# Patient Record
Sex: MALE | Race: WHITE | NOT HISPANIC OR LATINO | Employment: FULL TIME | URBAN - METROPOLITAN AREA
[De-identification: names, ages, dates, MRNs, and addresses within clinical notes are randomized per-mention and may not be internally consistent; named-entity substitution may affect disease eponyms.]

---

## 2022-04-29 ENCOUNTER — OFFICE VISIT (OUTPATIENT)
Dept: FAMILY MEDICINE CLINIC | Facility: CLINIC | Age: 37
End: 2022-04-29

## 2022-04-29 VITALS
DIASTOLIC BLOOD PRESSURE: 70 MMHG | HEART RATE: 74 BPM | BODY MASS INDEX: 26.41 KG/M2 | OXYGEN SATURATION: 99 % | HEIGHT: 72 IN | TEMPERATURE: 98.2 F | WEIGHT: 195 LBS | SYSTOLIC BLOOD PRESSURE: 112 MMHG

## 2022-04-29 DIAGNOSIS — M25.432 SWELLING OF JOINT OF LEFT WRIST: ICD-10-CM

## 2022-04-29 DIAGNOSIS — Z76.89 ENCOUNTER TO ESTABLISH CARE: Primary | ICD-10-CM

## 2022-04-29 DIAGNOSIS — M25.532 ARTHRALGIA OF LEFT WRIST: ICD-10-CM

## 2022-04-29 PROCEDURE — 99203 OFFICE O/P NEW LOW 30 MIN: CPT | Performed by: NURSE PRACTITIONER

## 2022-04-29 PROCEDURE — 73110 X-RAY EXAM OF WRIST: CPT | Performed by: NURSE PRACTITIONER

## 2022-04-29 RX ORDER — IBUPROFEN 800 MG/1
800 TABLET ORAL EVERY 8 HOURS PRN
Qty: 60 TABLET | Refills: 1 | Status: SHIPPED | OUTPATIENT
Start: 2022-04-29

## 2022-04-29 NOTE — PROGRESS NOTES
"Chief Complaint  Establish Care and Cyst (Left wrist / causes pain at times)    Subjective          Kofi Rivera III presents to [facility] PRIMARY [facility]ARE  History of Present Illness   36 yr old male presenting to establish care with complaints of L Wrist pain, states he noticed a knot pop up on wrist, which disappeared several days later but then has noticed more swelling, reports tenderness at times but not unbearable, has history of fracture to L wrist, will obtain X-Ray. Plans for annual physical in several weeks.     Objective   Vital Signs:   /70 (BP Location: Left arm, Patient Position: Sitting, Cuff Size: Adult)   Pulse 74   Temp 98.2 °F (36.8 °C) (Oral)   Ht 182.9 cm (72.01\")   Wt 88.5 kg (195 lb)   SpO2 99%   BMI 26.44 kg/m²     Physical Exam  Cardiovascular:      Rate and Rhythm: Normal rate.      Pulses: Normal pulses.      Heart sounds: Normal heart sounds.   Pulmonary:      Effort: Pulmonary effort is normal.      Breath sounds: Normal breath sounds.   Musculoskeletal:      Left wrist: Swelling and tenderness present.   Neurological:      General: No focal deficit present.      Mental Status: He is alert and oriented to person, place, and time.   Psychiatric:         Mood and Affect: Mood normal.         Behavior: Behavior normal.        Result Review :     X:Ray L wrist: no fracture noted, possible fluid or ganglion cyst, will confirm with Radiology report.                    Assessment and Plan    Diagnoses and all orders for this visit:    1. Encounter to establish care (Primary)    2. Swelling of joint of left wrist  -     XR Wrist 3+ View Left (In Office)  -     ibuprofen (ADVIL,MOTRIN) 800 MG tablet; Take 1 tablet by mouth Every 8 (Eight) Hours As Needed for Mild Pain .  Dispense: 60 tablet; Refill: 1    3. Arthralgia of left wrist  -     XR Wrist 3+ View Left (In Office)  -     ibuprofen (ADVIL,MOTRIN) 800 MG tablet; Take 1 tablet by mouth Every 8 (Eight) Hours As " Needed for Mild Pain .  Dispense: 60 tablet; Refill: 1             I spent 35 minutes caring for Kofi on this date of service. This time includes time spent by me in the following activities:obtaining and/or reviewing a separately obtained history, performing a medically appropriate examination and/or evaluation , counseling and educating the patient/family/caregiver, ordering medications, tests, or procedures and documenting information in the medical record  Follow Up   Return in about 1 month (around 5/29/2022) for Annual physical.  Patient was given instructions and counseling regarding his condition or for health maintenance advice. Please see specific information pulled into the AVS if appropriate.

## 2022-05-06 DIAGNOSIS — S63.074A: Primary | ICD-10-CM

## 2022-05-18 ENCOUNTER — OFFICE VISIT (OUTPATIENT)
Dept: FAMILY MEDICINE CLINIC | Facility: CLINIC | Age: 37
End: 2022-05-18

## 2022-05-18 VITALS
RESPIRATION RATE: 18 BRPM | DIASTOLIC BLOOD PRESSURE: 66 MMHG | HEART RATE: 60 BPM | BODY MASS INDEX: 26.09 KG/M2 | WEIGHT: 192.6 LBS | HEIGHT: 72 IN | TEMPERATURE: 97.1 F | SYSTOLIC BLOOD PRESSURE: 114 MMHG

## 2022-05-18 DIAGNOSIS — R68.82 DECREASED LIBIDO: ICD-10-CM

## 2022-05-18 DIAGNOSIS — Z00.00 PHYSICAL EXAM: Primary | ICD-10-CM

## 2022-05-18 DIAGNOSIS — R53.83 FATIGUE, UNSPECIFIED TYPE: ICD-10-CM

## 2022-05-18 DIAGNOSIS — B35.2 TINEA MANUUM: ICD-10-CM

## 2022-05-18 LAB
25(OH)D3 SERPL-MCNC: 22.2 NG/ML (ref 30–100)
ALBUMIN SERPL-MCNC: 4.4 G/DL (ref 3.5–5.2)
ALBUMIN/GLOB SERPL: 1.8 G/DL
ALP SERPL-CCNC: 89 U/L (ref 39–117)
ALT SERPL W P-5'-P-CCNC: 21 U/L (ref 1–41)
ANION GAP SERPL CALCULATED.3IONS-SCNC: 8.4 MMOL/L (ref 5–15)
AST SERPL-CCNC: 13 U/L (ref 1–40)
BILIRUB SERPL-MCNC: 0.9 MG/DL (ref 0–1.2)
BUN SERPL-MCNC: 16 MG/DL (ref 6–20)
BUN/CREAT SERPL: 12.9 (ref 7–25)
CALCIUM SPEC-SCNC: 9.7 MG/DL (ref 8.6–10.5)
CHLORIDE SERPL-SCNC: 102 MMOL/L (ref 98–107)
CHOLEST SERPL-MCNC: 256 MG/DL (ref 0–200)
CO2 SERPL-SCNC: 27.6 MMOL/L (ref 22–29)
CREAT SERPL-MCNC: 1.24 MG/DL (ref 0.76–1.27)
EGFRCR SERPLBLD CKD-EPI 2021: 78.2 ML/MIN/1.73
ERYTHROCYTE [DISTWIDTH] IN BLOOD BY AUTOMATED COUNT: 13 % (ref 12.3–15.4)
GLOBULIN UR ELPH-MCNC: 2.5 GM/DL
GLUCOSE SERPL-MCNC: 98 MG/DL (ref 65–99)
HCT VFR BLD AUTO: 46.8 % (ref 37.5–51)
HDLC SERPL-MCNC: 40 MG/DL (ref 40–60)
HGB BLD-MCNC: 15.7 G/DL (ref 13–17.7)
LDLC SERPL CALC-MCNC: 164 MG/DL (ref 0–100)
LDLC/HDLC SERPL: 4.03 {RATIO}
LYMPHOCYTES # BLD AUTO: 1.9 10*3/MM3 (ref 0.7–3.1)
LYMPHOCYTES NFR BLD AUTO: 36.3 % (ref 19.6–45.3)
MCH RBC QN AUTO: 28 PG (ref 26.6–33)
MCHC RBC AUTO-ENTMCNC: 33.5 G/DL (ref 31.5–35.7)
MCV RBC AUTO: 83.6 FL (ref 79–97)
MONOCYTES # BLD AUTO: 0.1 10*3/MM3 (ref 0.1–0.9)
MONOCYTES NFR BLD AUTO: 1.5 % (ref 5–12)
NEUTROPHILS NFR BLD AUTO: 3.2 10*3/MM3 (ref 1.7–7)
NEUTROPHILS NFR BLD AUTO: 62.2 % (ref 42.7–76)
PLATELET # BLD AUTO: 237 10*3/MM3 (ref 140–450)
PMV BLD AUTO: 8.5 FL (ref 6–12)
POTASSIUM SERPL-SCNC: 4.7 MMOL/L (ref 3.5–5.2)
PROT SERPL-MCNC: 6.9 G/DL (ref 6–8.5)
RBC # BLD AUTO: 5.6 10*6/MM3 (ref 4.14–5.8)
SODIUM SERPL-SCNC: 138 MMOL/L (ref 136–145)
TESTOST SERPL-MCNC: 390 NG/DL (ref 249–836)
TRIGL SERPL-MCNC: 274 MG/DL (ref 0–150)
VLDLC SERPL-MCNC: 52 MG/DL (ref 5–40)
WBC NRBC COR # BLD: 5.2 10*3/MM3 (ref 3.4–10.8)

## 2022-05-18 PROCEDURE — 80053 COMPREHEN METABOLIC PANEL: CPT | Performed by: NURSE PRACTITIONER

## 2022-05-18 PROCEDURE — 84403 ASSAY OF TOTAL TESTOSTERONE: CPT | Performed by: NURSE PRACTITIONER

## 2022-05-18 PROCEDURE — 80061 LIPID PANEL: CPT | Performed by: NURSE PRACTITIONER

## 2022-05-18 PROCEDURE — 82306 VITAMIN D 25 HYDROXY: CPT | Performed by: NURSE PRACTITIONER

## 2022-05-18 PROCEDURE — 99395 PREV VISIT EST AGE 18-39: CPT | Performed by: NURSE PRACTITIONER

## 2022-05-18 PROCEDURE — 85025 COMPLETE CBC W/AUTO DIFF WBC: CPT | Performed by: NURSE PRACTITIONER

## 2022-05-18 PROCEDURE — 99214 OFFICE O/P EST MOD 30 MIN: CPT | Performed by: NURSE PRACTITIONER

## 2022-05-18 RX ORDER — ATORVASTATIN CALCIUM 20 MG/1
20 TABLET, FILM COATED ORAL NIGHTLY
Qty: 90 TABLET | Refills: 2 | Status: SHIPPED | OUTPATIENT
Start: 2022-05-18

## 2022-05-18 RX ORDER — ERGOCALCIFEROL 1.25 MG/1
50000 CAPSULE ORAL WEEKLY
Qty: 12 CAPSULE | Refills: 2 | Status: SHIPPED | OUTPATIENT
Start: 2022-05-18

## 2022-05-18 NOTE — PROGRESS NOTES
"Chief Complaint  Annual Exam    Subjective          Kofi Rivera III presents to [facility]  History of Present Illness   36-year-old healthy male presenting for annual exam, he is fasting today for labs, single, lives alone, exercises 2-3 times weekly, eats well-balanced diet, does not smoke, family history of hyperlipidemia.  He has an ongoing fungus of hands, mainly right hand, states when he uses Lamisil topical cream it goes away but  when he stops using it comes right back,  will refer to dermatology for further evaluation.  He also reports reduction in sexual drive and fatigue, requesting to have testosterone level checked.     Objective   Vital Signs:  /66   Pulse 60   Temp 97.1 °F (36.2 °C)   Resp 18   Ht 182.9 cm (72\")   Wt 87.4 kg (192 lb 9.6 oz)   BMI 26.12 kg/m²   BMI is >= 25 and < 30. (Overweight) The following options were offered after discussion: weight loss educational material (shared in after visit summary)      Physical Exam  Vitals and nursing note reviewed.   Constitutional:       Appearance: He is well-developed.   HENT:      Head: Normocephalic.   Eyes:      Pupils: Pupils are equal, round, and reactive to light.   Cardiovascular:      Rate and Rhythm: Normal rate and regular rhythm.      Heart sounds: Normal heart sounds.   Pulmonary:      Effort: Pulmonary effort is normal.      Breath sounds: Normal breath sounds.   Abdominal:      General: Bowel sounds are normal.      Palpations: Abdomen is soft.   Musculoskeletal:         General: Normal range of motion.      Cervical back: Normal range of motion and neck supple.   Skin:     General: Skin is warm.      Findings: No rash.   Neurological:      Mental Status: He is alert and oriented to person, place, and time.   Psychiatric:         Behavior: Behavior normal.         Thought Content: Thought content normal.         Judgment: Judgment normal.        Result Review :                 Assessment " and Plan    Diagnoses and all orders for this visit:    1. Physical exam (Primary)  -     CBC & Differential  -     Comprehensive Metabolic Panel  -     Lipid Panel    2. Decreased libido  -     Vitamin D 25 Hydroxy  -     Testosterone    3. Fatigue, unspecified type  -     CBC & Differential  -     Comprehensive Metabolic Panel  -     Vitamin D 25 Hydroxy  -     Testosterone    4. Tinea manuum  -     Ambulatory Referral to Dermatology    Counseling was provided on nutrition, physical activity, development, and injury prevention, dental health, and safe sex practices patient verbalizes understanding no additional questions were asked.       Follow Up   Return in about 6 months (around 11/18/2022), or if symptoms worsen or fail to improve, for Recheck.  Patient was given instructions and counseling regarding his condition or for health maintenance advice. Please see specific information pulled into the AVS if appropriate.

## 2023-08-07 ENCOUNTER — OFFICE VISIT (OUTPATIENT)
Dept: FAMILY MEDICINE CLINIC | Facility: CLINIC | Age: 38
End: 2023-08-07
Payer: COMMERCIAL

## 2023-08-07 VITALS
HEART RATE: 69 BPM | SYSTOLIC BLOOD PRESSURE: 124 MMHG | HEIGHT: 72 IN | OXYGEN SATURATION: 98 % | RESPIRATION RATE: 16 BRPM | BODY MASS INDEX: 28.31 KG/M2 | WEIGHT: 209 LBS | DIASTOLIC BLOOD PRESSURE: 62 MMHG

## 2023-08-07 DIAGNOSIS — E55.9 VITAMIN D DEFICIENCY: ICD-10-CM

## 2023-08-07 DIAGNOSIS — E78.00 HYPERCHOLESTEROLEMIA: ICD-10-CM

## 2023-08-07 DIAGNOSIS — Z00.00 PREVENTATIVE HEALTH CARE: ICD-10-CM

## 2023-08-07 DIAGNOSIS — B35.1 ONYCHOMYCOSIS: ICD-10-CM

## 2023-08-07 DIAGNOSIS — Z00.00 ANNUAL PHYSICAL EXAM: Primary | ICD-10-CM

## 2023-08-07 DIAGNOSIS — Z11.59 NEED FOR HEPATITIS C SCREENING TEST: ICD-10-CM

## 2023-08-07 PROCEDURE — 99395 PREV VISIT EST AGE 18-39: CPT | Performed by: NURSE PRACTITIONER

## 2023-08-07 PROCEDURE — 99214 OFFICE O/P EST MOD 30 MIN: CPT | Performed by: NURSE PRACTITIONER

## 2023-08-07 PROCEDURE — 90471 IMMUNIZATION ADMIN: CPT | Performed by: NURSE PRACTITIONER

## 2023-08-07 PROCEDURE — 90715 TDAP VACCINE 7 YRS/> IM: CPT | Performed by: NURSE PRACTITIONER

## 2023-08-07 RX ORDER — TRIAMCINOLONE ACETONIDE 1 MG/G
CREAM TOPICAL
COMMUNITY
Start: 2023-07-11

## 2023-08-07 RX ORDER — TERBINAFINE HYDROCHLORIDE 250 MG/1
250 TABLET ORAL DAILY
Qty: 90 TABLET | Refills: 1 | Status: SHIPPED | OUTPATIENT
Start: 2023-08-07

## 2023-08-07 NOTE — PROGRESS NOTES
Subjective   Kofi Rivera III is a 37 y.o. male. Pt here today to [facility] care hx arthritis HLD Fatigue neck pain     History of Present Illness Kofi has had onychomycosis of the left pinky fingernail for over a year.  He uses topic terbinafine, but the nail does not clear.  He has a rash on his palm that he uses tramcinolone on, which has not completely cleared either.  He is frustrated by this and would like it to clear up.    Kofi also reports he is taking Vitamin D supplements for his low vitamin D levels.  He reports he does feel better now that he is taking this.    The following portions of the patient's history were reviewed and updated as appropriate: allergies, current medications, past family history, past medical history, past social history, past surgical history, and problem list.    Review of Systems   Constitutional:  Positive for fatigue.   HENT: Negative.     Eyes: Negative.    Respiratory: Negative.     Cardiovascular: Negative.    Gastrointestinal: Negative.    Endocrine: Negative.    Genitourinary: Negative.    Musculoskeletal:  Positive for arthralgias and neck pain.   Skin:  Positive for rash.   Allergic/Immunologic: Negative.    Neurological: Negative.    Hematological: Negative.    Psychiatric/Behavioral: Negative.       Objective   Physical Exam  Constitutional:       Appearance: Normal appearance.   HENT:      Head: Normocephalic and atraumatic.      Right Ear: Tympanic membrane, ear canal and external ear normal.      Left Ear: Tympanic membrane, ear canal and external ear normal.      Mouth/Throat:      Mouth: Mucous membranes are moist.   Eyes:      Pupils: Pupils are equal, round, and reactive to light.   Cardiovascular:      Rate and Rhythm: Normal rate and regular rhythm.      Pulses: Normal pulses.      Heart sounds: Normal heart sounds.   Pulmonary:      Effort: Pulmonary effort is normal.      Breath sounds: Normal breath sounds.   Abdominal:      General: Abdomen is flat.    Musculoskeletal:         General: Normal range of motion.   Skin:     General: Skin is warm.      Capillary Refill: Capillary refill takes less than 2 seconds.   Neurological:      General: No focal deficit present.      Mental Status: He is alert.   Psychiatric:         Mood and Affect: Mood normal.         Assessment & Plan   Diagnoses and all orders for this visit:    1. Annual physical exam (Primary)  -     CBC & Differential  -     Comprehensive Metabolic Panel    2. Hypercholesterolemia  -     Lipid Panel    3. Onychomycosis  -     terbinafine (lamiSIL) 250 MG tablet; Take 1 tablet by mouth Daily.  Dispense: 90 tablet; Refill: 1    4. Vitamin D deficiency  -     Vitamin D,25-Hydroxy    5. Need for hepatitis C screening test  -     HCV Antibody Rfx To Qnt PCR    6. Preventative health care  -     Tdap Vaccine Greater Than or Equal To 6yo IM    Discussed Care Gaps, ordered referrals and encouraged vaccination updates.       - Pt agrees with plan of care and denies further questions/concerns today  - This document is intended for medical expert use only. Persons  reading this document without medical staff guidance may result in misinterpretation and unintended morbidity     Go to the ER for any possible life-threatening symptoms such as chest pain or shortness of air.      Please allow 3-5 business days for recommendations based on new results      I personally spent time with this patient, preparing for the visit, reviewing tests, obtaining and/or reviewing a separately obtained history, performing a medically appropriate examination and/or evaluation, counseling and educating the patient/family/caregiver, ordering medications,  documenting information in the medical record and indepentently interpreting results.         Return in about 6 months (around 2/7/2024) for Next scheduled follow up.

## 2023-08-08 NOTE — PROGRESS NOTES
Kofi,    Your cholesterol levels need major work.  Your Triglycerides are over 900 and some values cannot be calculated because of this.  You need to start a low-fat diet, avoid all fast food, all processed foods, eat more vegetables and fruits, limit red meat to once a week.  No torres, no sausage, no brats!!!!!  Increase your exercise to 45 minutes 5 days a week.  Your vitamin D level is still low.  Get in the habit of taking this either with breakfast for take it at bedtime, that way you won't forget it.  You were non-reactive to Hep C, meaning you have not been exposed, which is good.  Your liver and kidney functions look fine.    Work on your cholesterol - make sure you are taking the statin as prescribed.  We will see you in 6 months unless you need us sooner.

## 2023-08-09 LAB
25(OH)D3+25(OH)D2 SERPL-MCNC: 25.5 NG/ML (ref 30–100)
ALBUMIN SERPL-MCNC: 4.5 G/DL (ref 4.1–5.1)
ALBUMIN/GLOB SERPL: 2.1 {RATIO} (ref 1.2–2.2)
ALP SERPL-CCNC: 82 IU/L (ref 44–121)
ALT SERPL-CCNC: 29 IU/L (ref 0–44)
AST SERPL-CCNC: 20 IU/L (ref 0–40)
BASOPHILS # BLD AUTO: 0 X10E3/UL (ref 0–0.2)
BASOPHILS NFR BLD AUTO: 1 %
BILIRUB SERPL-MCNC: 0.7 MG/DL (ref 0–1.2)
BUN SERPL-MCNC: 13 MG/DL (ref 6–20)
BUN/CREAT SERPL: 11 (ref 9–20)
CALCIUM SERPL-MCNC: 9.6 MG/DL (ref 8.7–10.2)
CHLORIDE SERPL-SCNC: 99 MMOL/L (ref 96–106)
CHOLEST SERPL-MCNC: 301 MG/DL (ref 100–199)
CO2 SERPL-SCNC: 24 MMOL/L (ref 20–29)
CREAT SERPL-MCNC: 1.22 MG/DL (ref 0.76–1.27)
EGFRCR SERPLBLD CKD-EPI 2021: 79 ML/MIN/1.73
EOSINOPHIL # BLD AUTO: 0.1 X10E3/UL (ref 0–0.4)
EOSINOPHIL NFR BLD AUTO: 1 %
ERYTHROCYTE [DISTWIDTH] IN BLOOD BY AUTOMATED COUNT: 13.3 % (ref 11.6–15.4)
GLOBULIN SER CALC-MCNC: 2.1 G/DL (ref 1.5–4.5)
GLUCOSE SERPL-MCNC: 97 MG/DL (ref 70–99)
HCT VFR BLD AUTO: 50.5 % (ref 37.5–51)
HCV AB SERPL QL IA: NORMAL
HCV IGG SERPL QL IA: NON REACTIVE
HDLC SERPL-MCNC: 33 MG/DL
HGB BLD-MCNC: 17.2 G/DL (ref 13–17.7)
IMM GRANULOCYTES # BLD AUTO: 0 X10E3/UL (ref 0–0.1)
IMM GRANULOCYTES NFR BLD AUTO: 0 %
LDLC SERPL CALC-MCNC: ABNORMAL MG/DL (ref 0–99)
LYMPHOCYTES # BLD AUTO: 2.2 X10E3/UL (ref 0.7–3.1)
LYMPHOCYTES NFR BLD AUTO: 33 %
MCH RBC QN AUTO: 29.2 PG (ref 26.6–33)
MCHC RBC AUTO-ENTMCNC: 34.1 G/DL (ref 31.5–35.7)
MCV RBC AUTO: 86 FL (ref 79–97)
MONOCYTES # BLD AUTO: 0.4 X10E3/UL (ref 0.1–0.9)
MONOCYTES NFR BLD AUTO: 6 %
NEUTROPHILS # BLD AUTO: 3.9 X10E3/UL (ref 1.4–7)
NEUTROPHILS NFR BLD AUTO: 59 %
PLATELET # BLD AUTO: 221 X10E3/UL (ref 150–450)
POTASSIUM SERPL-SCNC: 4.7 MMOL/L (ref 3.5–5.2)
PROT SERPL-MCNC: 6.6 G/DL (ref 6–8.5)
RBC # BLD AUTO: 5.89 X10E6/UL (ref 4.14–5.8)
SODIUM SERPL-SCNC: 139 MMOL/L (ref 134–144)
TRIGL SERPL-MCNC: 937 MG/DL (ref 0–149)
VLDLC SERPL CALC-MCNC: ABNORMAL MG/DL (ref 5–40)
WBC # BLD AUTO: 6.6 X10E3/UL (ref 3.4–10.8)

## 2023-11-20 RX ORDER — ATORVASTATIN CALCIUM 20 MG/1
20 TABLET, FILM COATED ORAL NIGHTLY
Qty: 90 TABLET | Refills: 3 | Status: SHIPPED | OUTPATIENT
Start: 2023-11-20

## 2024-02-07 ENCOUNTER — OFFICE VISIT (OUTPATIENT)
Dept: FAMILY MEDICINE CLINIC | Facility: CLINIC | Age: 39
End: 2024-02-07
Payer: COMMERCIAL

## 2024-02-07 VITALS
HEIGHT: 72 IN | OXYGEN SATURATION: 98 % | DIASTOLIC BLOOD PRESSURE: 76 MMHG | BODY MASS INDEX: 27.6 KG/M2 | HEART RATE: 72 BPM | WEIGHT: 203.8 LBS | SYSTOLIC BLOOD PRESSURE: 130 MMHG

## 2024-02-07 DIAGNOSIS — E55.9 VITAMIN D DEFICIENCY: ICD-10-CM

## 2024-02-07 DIAGNOSIS — E78.2 MIXED HYPERLIPIDEMIA: Primary | ICD-10-CM

## 2024-02-07 PROCEDURE — 99213 OFFICE O/P EST LOW 20 MIN: CPT | Performed by: NURSE PRACTITIONER

## 2024-02-07 RX ORDER — ERGOCALCIFEROL 1.25 MG/1
50000 CAPSULE ORAL WEEKLY
Qty: 12 CAPSULE | Refills: 2 | Status: SHIPPED | OUTPATIENT
Start: 2024-02-07

## 2024-02-07 RX ORDER — ATORVASTATIN CALCIUM 20 MG/1
20 TABLET, FILM COATED ORAL NIGHTLY
Qty: 90 TABLET | Refills: 3 | Status: SHIPPED | OUTPATIENT
Start: 2024-02-07

## 2024-02-07 NOTE — PROGRESS NOTES
Subjective   Kofi Rivera III is a 38 y.o. male. Presents today for   Chief Complaint   Patient presents with    Hyperlipidemia     Vit D  Needs lab to review levels       History Of Present Illness  He has hyperlipidemia and has been taking his medication regularly.    Vitamin D deficiency - he is taking 2 gummies a day.      Patient Active Problem List   Diagnosis    Painful orthopaedic hardware       Social History     Socioeconomic History    Marital status:    Tobacco Use    Smoking status: Never    Smokeless tobacco: Never   Vaping Use    Vaping Use: Never used   Substance and Sexual Activity    Alcohol use: Yes     Alcohol/week: 1.0 standard drink of alcohol     Types: 1 Shots of liquor per week     Comment: SOCIALLY COUPLE DRINKS A MONTH    Drug use: No    Sexual activity: Yes     Partners: Female       No Known Allergies    Current Outpatient Medications on File Prior to Visit   Medication Sig Dispense Refill    ibuprofen (ADVIL,MOTRIN) 800 MG tablet Take 1 tablet by mouth Every 8 (Eight) Hours As Needed for Mild Pain . 60 tablet 1    [DISCONTINUED] atorvastatin (Lipitor) 20 MG tablet Take 1 tablet by mouth Every Night. 90 tablet 3    [DISCONTINUED] vitamin D (ERGOCALCIFEROL) 1.25 MG (75218 UT) capsule capsule Take 1 capsule by mouth 1 (One) Time Per Week. 12 capsule 2    [DISCONTINUED] econazole nitrate (SPECTAZOLE) 1 % cream APPLY TOPICALLY TO AFFECTED NAILS 2 TIMES A DAY (Patient not taking: Reported on 2/7/2024)      [DISCONTINUED] terbinafine (lamiSIL) 250 MG tablet Take 1 tablet by mouth Daily. (Patient not taking: Reported on 2/7/2024) 90 tablet 1    [DISCONTINUED] triamcinolone (KENALOG) 0.1 % cream Apply topically to affected itchy spots on hands and feet 2 times a day as needed for flares only for 2 weeks per month; discontinue upon im (Patient not taking: Reported on 2/7/2024)       No current facility-administered medications on file prior to visit.       Objective   Vitals:    02/07/24  "1403   BP: 130/76   Pulse: 72   SpO2: 98%   Weight: 92.4 kg (203 lb 12.8 oz)   Height: 182.9 cm (72\")     Body mass index is 27.64 kg/m².    Physical Exam  Constitutional:       Appearance: He is obese.   HENT:      Head: Normocephalic and atraumatic.      Nose: Nose normal.      Mouth/Throat:      Mouth: Mucous membranes are dry.   Eyes:      Pupils: Pupils are equal, round, and reactive to light.   Cardiovascular:      Rate and Rhythm: Normal rate and regular rhythm.      Pulses: Normal pulses.      Heart sounds: Normal heart sounds.   Pulmonary:      Effort: Pulmonary effort is normal.      Breath sounds: Normal breath sounds.   Abdominal:      General: Bowel sounds are normal.   Musculoskeletal:         General: Normal range of motion.   Skin:     General: Skin is warm and dry.      Capillary Refill: Capillary refill takes less than 2 seconds.   Neurological:      General: No focal deficit present.      Mental Status: He is alert.   Psychiatric:         Mood and Affect: Mood normal.     Procedures     Assessment & Plan   Diagnoses and all orders for this visit:    1. Mixed hyperlipidemia (Primary)  -     CBC & Differential  -     Comprehensive Metabolic Panel  -     Lipid Panel  -     atorvastatin (Lipitor) 20 MG tablet; Take 1 tablet by mouth Every Night.  Dispense: 90 tablet; Refill: 3    2. Vitamin D deficiency  -     Vitamin D,25-Hydroxy  -     vitamin D (ERGOCALCIFEROL) 1.25 MG (83614 UT) capsule capsule; Take 1 capsule by mouth 1 (One) Time Per Week.  Dispense: 12 capsule; Refill: 2    Discussed Care Gaps, ordered referrals and encouraged vaccination updates.       - Pt agrees with plan of care and denies further questions/concerns today  - This document is intended for medical expert use only. Persons  reading this document without medical staff guidance may result in misinterpretation and unintended morbidity     Go to the ER for any possible life-threatening symptoms such as chest pain or shortness of " air.      Please allow 3-5 business days for recommendations based on new results      I personally spent time with this patient, preparing for the visit, reviewing tests, obtaining and/or reviewing a separately obtained history, performing a medically appropriate examination and/or evaluation, counseling and educating the patient/family/caregiver, ordering medications,  documenting information in the medical record and indepentently interpreting results.              BMI is >= 25 and <30. (Overweight) The following options were offered after discussion;: weight loss educational material (shared in after visit summary), exercise counseling/recommendations, nutrition counseling/recommendations, and pharmacological intervention options     Return in about 6 months (around 8/7/2024).        Answers submitted by the patient for this visit:  Primary Reason for Visit (Submitted on 2/7/2024)  What is the primary reason for your visit?: Other  Other (Submitted on 2/7/2024)  Please describe your symptoms.: fatigue , Checkup for cholesterol and vitamin levels  Have you had these symptoms before?: Yes  How long have you been having these symptoms?: Greater than 2 weeks  Please list any medications you are currently taking for this condition.: OTC vitamin D3 2000UI/day, Atorvostatin 20MG/day  Please describe any probable cause for these symptoms. : vitamin D deficiency , High cholesterol

## 2024-02-08 LAB
25(OH)D3+25(OH)D2 SERPL-MCNC: 36.6 NG/ML (ref 30–100)
ALBUMIN SERPL-MCNC: 4.8 G/DL (ref 4.1–5.1)
ALBUMIN/GLOB SERPL: 2.5 {RATIO} (ref 1.2–2.2)
ALP SERPL-CCNC: 90 IU/L (ref 44–121)
ALT SERPL-CCNC: 31 IU/L (ref 0–44)
AST SERPL-CCNC: 17 IU/L (ref 0–40)
BASOPHILS # BLD AUTO: 0 X10E3/UL (ref 0–0.2)
BASOPHILS NFR BLD AUTO: 1 %
BILIRUB SERPL-MCNC: 1.1 MG/DL (ref 0–1.2)
BUN SERPL-MCNC: 13 MG/DL (ref 6–20)
BUN/CREAT SERPL: 11 (ref 9–20)
CALCIUM SERPL-MCNC: 9.9 MG/DL (ref 8.7–10.2)
CHLORIDE SERPL-SCNC: 101 MMOL/L (ref 96–106)
CHOLEST SERPL-MCNC: 165 MG/DL (ref 100–199)
CO2 SERPL-SCNC: 29 MMOL/L (ref 20–29)
CREAT SERPL-MCNC: 1.2 MG/DL (ref 0.76–1.27)
EGFRCR SERPLBLD CKD-EPI 2021: 80 ML/MIN/1.73
EOSINOPHIL # BLD AUTO: 0 X10E3/UL (ref 0–0.4)
EOSINOPHIL NFR BLD AUTO: 1 %
ERYTHROCYTE [DISTWIDTH] IN BLOOD BY AUTOMATED COUNT: 12.5 % (ref 11.6–15.4)
GLOBULIN SER CALC-MCNC: 1.9 G/DL (ref 1.5–4.5)
GLUCOSE SERPL-MCNC: 108 MG/DL (ref 70–99)
HCT VFR BLD AUTO: 50.9 % (ref 37.5–51)
HDLC SERPL-MCNC: 38 MG/DL
HGB BLD-MCNC: 16.9 G/DL (ref 13–17.7)
IMM GRANULOCYTES # BLD AUTO: 0 X10E3/UL (ref 0–0.1)
IMM GRANULOCYTES NFR BLD AUTO: 0 %
LDLC SERPL CALC-MCNC: 94 MG/DL (ref 0–99)
LYMPHOCYTES # BLD AUTO: 1.8 X10E3/UL (ref 0.7–3.1)
LYMPHOCYTES NFR BLD AUTO: 30 %
MCH RBC QN AUTO: 28.7 PG (ref 26.6–33)
MCHC RBC AUTO-ENTMCNC: 33.2 G/DL (ref 31.5–35.7)
MCV RBC AUTO: 87 FL (ref 79–97)
MONOCYTES # BLD AUTO: 0.3 X10E3/UL (ref 0.1–0.9)
MONOCYTES NFR BLD AUTO: 6 %
NEUTROPHILS # BLD AUTO: 3.7 X10E3/UL (ref 1.4–7)
NEUTROPHILS NFR BLD AUTO: 62 %
PLATELET # BLD AUTO: 243 X10E3/UL (ref 150–450)
POTASSIUM SERPL-SCNC: 4.7 MMOL/L (ref 3.5–5.2)
PROT SERPL-MCNC: 6.7 G/DL (ref 6–8.5)
RBC # BLD AUTO: 5.88 X10E6/UL (ref 4.14–5.8)
SODIUM SERPL-SCNC: 143 MMOL/L (ref 134–144)
TRIGL SERPL-MCNC: 194 MG/DL (ref 0–149)
VLDLC SERPL CALC-MCNC: 33 MG/DL (ref 5–40)
WBC # BLD AUTO: 6 X10E3/UL (ref 3.4–10.8)

## 2024-07-30 ENCOUNTER — OFFICE VISIT (OUTPATIENT)
Dept: FAMILY MEDICINE CLINIC | Facility: CLINIC | Age: 39
End: 2024-07-30
Payer: COMMERCIAL

## 2024-07-30 VITALS
HEIGHT: 72 IN | WEIGHT: 206 LBS | SYSTOLIC BLOOD PRESSURE: 130 MMHG | DIASTOLIC BLOOD PRESSURE: 68 MMHG | HEART RATE: 50 BPM | BODY MASS INDEX: 27.9 KG/M2 | OXYGEN SATURATION: 97 %

## 2024-07-30 DIAGNOSIS — E78.2 MIXED HYPERLIPIDEMIA: Primary | ICD-10-CM

## 2024-07-30 LAB
BASOPHILS # BLD AUTO: 0.03 10*3/MM3 (ref 0–0.2)
BASOPHILS NFR BLD AUTO: 0.6 % (ref 0–1.5)
BUN SERPL-MCNC: 13 MG/DL (ref 6–20)
BUN/CREAT SERPL: 10.2 (ref 7–25)
CALCIUM SERPL-MCNC: 8.9 MG/DL (ref 8.6–10.5)
CHLORIDE SERPL-SCNC: 106 MMOL/L (ref 98–107)
CHOLEST SERPL-MCNC: 160 MG/DL (ref 0–200)
CO2 SERPL-SCNC: 28.4 MMOL/L (ref 22–29)
CREAT SERPL-MCNC: 1.27 MG/DL (ref 0.76–1.27)
EGFRCR SERPLBLD CKD-EPI 2021: 75.1 ML/MIN/1.73
EOSINOPHIL # BLD AUTO: 0.05 10*3/MM3 (ref 0–0.4)
EOSINOPHIL NFR BLD AUTO: 0.9 % (ref 0.3–6.2)
ERYTHROCYTE [DISTWIDTH] IN BLOOD BY AUTOMATED COUNT: 12.8 % (ref 12.3–15.4)
GLUCOSE SERPL-MCNC: 95 MG/DL (ref 65–99)
HBA1C MFR BLD: 5.6 % (ref 4.8–5.6)
HCT VFR BLD AUTO: 45.9 % (ref 37.5–51)
HDLC SERPL-MCNC: 32 MG/DL (ref 40–60)
HGB BLD-MCNC: 15.2 G/DL (ref 13–17.7)
IMM GRANULOCYTES # BLD AUTO: 0.01 10*3/MM3 (ref 0–0.05)
IMM GRANULOCYTES NFR BLD AUTO: 0.2 % (ref 0–0.5)
LDLC SERPL CALC-MCNC: 73 MG/DL (ref 0–100)
LYMPHOCYTES # BLD AUTO: 1.98 10*3/MM3 (ref 0.7–3.1)
LYMPHOCYTES NFR BLD AUTO: 36.5 % (ref 19.6–45.3)
MCH RBC QN AUTO: 28.8 PG (ref 26.6–33)
MCHC RBC AUTO-ENTMCNC: 33.1 G/DL (ref 31.5–35.7)
MCV RBC AUTO: 87.1 FL (ref 79–97)
MONOCYTES # BLD AUTO: 0.35 10*3/MM3 (ref 0.1–0.9)
MONOCYTES NFR BLD AUTO: 6.4 % (ref 5–12)
NEUTROPHILS # BLD AUTO: 3.01 10*3/MM3 (ref 1.7–7)
NEUTROPHILS NFR BLD AUTO: 55.4 % (ref 42.7–76)
NRBC BLD AUTO-RTO: 0 /100 WBC (ref 0–0.2)
PLATELET # BLD AUTO: 212 10*3/MM3 (ref 140–450)
POTASSIUM SERPL-SCNC: 4 MMOL/L (ref 3.5–5.2)
RBC # BLD AUTO: 5.27 10*6/MM3 (ref 4.14–5.8)
SODIUM SERPL-SCNC: 142 MMOL/L (ref 136–145)
TRIGL SERPL-MCNC: 343 MG/DL (ref 0–150)
VLDLC SERPL CALC-MCNC: 55 MG/DL (ref 5–40)
WBC # BLD AUTO: 5.43 10*3/MM3 (ref 3.4–10.8)

## 2024-07-30 PROCEDURE — 99213 OFFICE O/P EST LOW 20 MIN: CPT | Performed by: NURSE PRACTITIONER

## 2024-07-30 NOTE — PROGRESS NOTES
"Subjective   Kofi Rivera III is a 38 y.o. male. Presents today for   Chief Complaint   Patient presents with   • Hyperlipidemia     6 month Follow up    Too soon for Physical Pt had rescheduled due to work Schedule       History Of Present Illness:      Hyperlipidemia:  Atorvastatin    Vitamin D Deficiency:        Patient Active Problem List   Diagnosis   • Painful orthopaedic hardware       Social History     Socioeconomic History   • Marital status:    Tobacco Use   • Smoking status: Never   • Smokeless tobacco: Never   Vaping Use   • Vaping status: Never Used   Substance and Sexual Activity   • Alcohol use: Yes     Alcohol/week: 1.0 standard drink of alcohol     Types: 1 Shots of liquor per week     Comment: SOCIALLY COUPLE DRINKS A MONTH   • Drug use: No   • Sexual activity: Yes     Partners: Female       No Known Allergies    Current Outpatient Medications on File Prior to Visit   Medication Sig Dispense Refill   • atorvastatin (Lipitor) 20 MG tablet Take 1 tablet by mouth Every Night. 90 tablet 3   • vitamin D (ERGOCALCIFEROL) 1.25 MG (92374 UT) capsule capsule Take 1 capsule by mouth 1 (One) Time Per Week. 12 capsule 2   • ibuprofen (ADVIL,MOTRIN) 800 MG tablet Take 1 tablet by mouth Every 8 (Eight) Hours As Needed for Mild Pain . (Patient not taking: Reported on 7/30/2024) 60 tablet 1     No current facility-administered medications on file prior to visit.       Objective   Vitals:    07/30/24 0820   BP: 130/68   Pulse: 50   SpO2: 97%   Weight: 93.4 kg (206 lb)   Height: 182.9 cm (72\")     Body mass index is 27.94 kg/m².    Physical Exam  Constitutional:       Appearance: He is obese.   HENT:      Head: Normocephalic and atraumatic.      Nose: Nose normal.      Mouth/Throat:      Mouth: Mucous membranes are moist.   Eyes:      Pupils: Pupils are equal, round, and reactive to light.   Cardiovascular:      Rate and Rhythm: Normal rate and regular rhythm.      Pulses: Normal pulses.      Heart sounds: " Normal heart sounds.   Pulmonary:      Effort: Pulmonary effort is normal.      Breath sounds: Normal breath sounds.   Abdominal:      General: Abdomen is flat.      Palpations: Abdomen is soft.   Musculoskeletal:         General: Normal range of motion.   Skin:     General: Skin is warm and dry.      Capillary Refill: Capillary refill takes less than 2 seconds.   Neurological:      General: No focal deficit present.      Mental Status: He is alert.   Psychiatric:         Mood and Affect: Mood normal.         Procedures     Assessment & Plan   There are no diagnoses linked to this encounter.                  No follow-ups on file.

## 2025-01-31 NOTE — PROGRESS NOTES
Subjective   Kofi Rivera III is a 39 y.o. male. Presents today for   Chief Complaint   Patient presents with    Hyperlipidemia       History Of Present Illness Kofi Lewis is a 39-year-old male presenting for 6-month follow-up    Hyperlipidemia: Atorvastatin    Care gaps:  COVID  Influenza today    History of Present Illness  The patient presents for evaluation of cholesterol management, elevated blood pressure, and medication management.    He reports a significant improvement in his mental health since starting his new job on 11/23/2024. He has been adhering to a diet low in red meat, with occasional lapses. He is uncertain about the necessity of continuing biannual visits if his condition remains stable. He is also curious about the potential for dosage reduction or discontinuation of atorvastatin if his cholesterol levels continue to improve. He took his atorvastatin at 5:00 AM today and is seeking clarification on whether this will affect his fasting blood work results. He typically takes his atorvastatin once daily, either at night or early in the morning, depending on his sleep schedule.    He has been experiencing elevated blood pressure, which he attributes to recent cessation of caffeine intake approximately 6 weeks ago. He also reports experiencing white coat syndrome, a condition characterized by anxiety-induced hypertension during doctor's visits.    He is nearing the end of his atorvastatin supply and has run out of vitamin D, having forgotten to take it over the weekend. His wife informed him that he does not have any refills remaining for the vitamin D prescription.    Supplemental Information  He reports no current issues with his wrist and notes an improvement in the condition of his feet, ankles, and knees.    SOCIAL HISTORY  He recently quit drinking caffeine 6 weeks ago.    MEDICATIONS  atorvastatin, vitamin D    Patient Active Problem List   Diagnosis    Painful orthopaedic hardware  "      Social History     Socioeconomic History    Marital status:    Tobacco Use    Smoking status: Never    Smokeless tobacco: Never   Vaping Use    Vaping status: Never Used   Substance and Sexual Activity    Alcohol use: Yes     Alcohol/week: 1.0 standard drink of alcohol     Types: 1 Shots of liquor per week     Comment: SOCIALLY COUPLE DRINKS A MONTH    Drug use: Yes     Types: Cocaine(coke)    Sexual activity: Yes     Partners: Female       No Known Allergies    Current Outpatient Medications on File Prior to Visit   Medication Sig Dispense Refill    ibuprofen (ADVIL,MOTRIN) 800 MG tablet Take 1 tablet by mouth Every 8 (Eight) Hours As Needed for Mild Pain . 60 tablet 1    [DISCONTINUED] atorvastatin (Lipitor) 20 MG tablet Take 1 tablet by mouth Every Night. 90 tablet 3    [DISCONTINUED] vitamin D (ERGOCALCIFEROL) 1.25 MG (30488 UT) capsule capsule Take 1 capsule by mouth 1 (One) Time Per Week. 12 capsule 2     No current facility-administered medications on file prior to visit.       Objective   Vitals:    02/03/25 0804   BP: 144/82   Pulse: 71   SpO2: 97%   Weight: 95.3 kg (210 lb)   Height: 182.9 cm (72\")     Body mass index is 28.48 kg/m².    Physical Exam  Vital Signs  Blood pressure is slightly elevated.  Physical Exam  Constitutional:       Appearance: Normal appearance.   HENT:      Head: Normocephalic and atraumatic.   Eyes:      Pupils: Pupils are equal, round, and reactive to light.   Cardiovascular:      Rate and Rhythm: Normal rate and regular rhythm.      Pulses: Normal pulses.      Heart sounds: Normal heart sounds.   Pulmonary:      Effort: Pulmonary effort is normal.      Breath sounds: Normal breath sounds.   Abdominal:      General: Bowel sounds are normal.   Musculoskeletal:         General: Normal range of motion.      Cervical back: Normal range of motion.   Skin:     General: Skin is warm and dry.      Capillary Refill: Capillary refill takes less than 2 seconds.   Neurological: "      General: No focal deficit present.      Mental Status: He is alert.   Psychiatric:         Mood and Affect: Mood normal.       Results  Laboratory Studies  Cholesterol levels were under control.       Procedures     Assessment & Plan   Diagnoses and all orders for this visit:    1. Mixed hyperlipidemia (Primary)  -     CBC & Differential  -     Basic Metabolic Panel  -     Lipid Panel  -     atorvastatin (Lipitor) 20 MG tablet; Take 1 tablet by mouth Every Night.  Dispense: 90 tablet; Refill: 3    2. Preventative health care  -     Fluzone >6mos (7913-2228)    3. Vitamin D deficiency  -     vitamin D (ERGOCALCIFEROL) 1.25 MG (03407 UT) capsule capsule; Take 1 capsule by mouth 1 (One) Time Per Week.  Dispense: 12 capsule; Refill: 2         Assessment & Plan  1. Cholesterol management.  His cholesterol levels were previously well-managed. He has been taking atorvastatin inconsistently, sometimes in the early morning hours. He was advised to take atorvastatin at night to minimize potential muscle aches and to maintain a consistent daily intake. A blood test will be conducted today to assess his current cholesterol levels. If the results indicate that his cholesterol levels are within the maintenance range, the dosage of atorvastatin may be reduced to half a tablet.    2. Elevated blood pressure.  His blood pressure was slightly elevated during this visit. He mentioned quitting caffeine 6 weeks ago, which would typically lower blood pressure, but he also reported a busy weekend and experiencing white coat syndrome.    3. Medication management.  He mentioned that he is running low on atorvastatin and vitamin D. Refills for both medications will be provided.       Body mass index is 28.48 kg/m².    Discussed Care Gaps, ordered referrals and encouraged vaccination updates.       - Pt agrees with plan of care and denies further questions/concerns today  - This document is intended for medical expert use only.  Persons  reading this document without medical staff guidance may result in misinterpretation and unintended morbidity     Go to the ER for any possible life-threatening symptoms such as chest pain or shortness of air.      Please allow 3-5 business days for recommendations based on new results      I personally spent time with this patient, preparing for the visit, reviewing tests, obtaining and/or reviewing a separately obtained history, performing a medically appropriate examination and/or evaluation, counseling and educating the patient/family/caregiver, ordering medications,  documenting information in the medical record and indepentently interpreting results.                 Return in about 6 months (around 8/3/2025).  Unless cholesterol levels are fine, then we will push out to a year.    Patient or patient representative verbalized consent for the use of Ambient Listening during the visit with  KYARA Coyle for chart documentation. 2/3/2025  09:00 EST

## 2025-02-03 ENCOUNTER — OFFICE VISIT (OUTPATIENT)
Dept: FAMILY MEDICINE CLINIC | Facility: CLINIC | Age: 40
End: 2025-02-03
Payer: COMMERCIAL

## 2025-02-03 VITALS
OXYGEN SATURATION: 97 % | SYSTOLIC BLOOD PRESSURE: 144 MMHG | WEIGHT: 210 LBS | DIASTOLIC BLOOD PRESSURE: 82 MMHG | BODY MASS INDEX: 28.44 KG/M2 | HEART RATE: 71 BPM | HEIGHT: 72 IN

## 2025-02-03 DIAGNOSIS — Z00.00 PREVENTATIVE HEALTH CARE: ICD-10-CM

## 2025-02-03 DIAGNOSIS — E78.2 MIXED HYPERLIPIDEMIA: Primary | ICD-10-CM

## 2025-02-03 DIAGNOSIS — E55.9 VITAMIN D DEFICIENCY: ICD-10-CM

## 2025-02-03 PROCEDURE — 90471 IMMUNIZATION ADMIN: CPT | Performed by: NURSE PRACTITIONER

## 2025-02-03 PROCEDURE — 99214 OFFICE O/P EST MOD 30 MIN: CPT | Performed by: NURSE PRACTITIONER

## 2025-02-03 PROCEDURE — 90656 IIV3 VACC NO PRSV 0.5 ML IM: CPT | Performed by: NURSE PRACTITIONER

## 2025-02-03 RX ORDER — ERGOCALCIFEROL 1.25 MG/1
50000 CAPSULE, LIQUID FILLED ORAL WEEKLY
Qty: 12 CAPSULE | Refills: 2 | Status: SHIPPED | OUTPATIENT
Start: 2025-02-03

## 2025-02-03 RX ORDER — ATORVASTATIN CALCIUM 20 MG/1
20 TABLET, FILM COATED ORAL NIGHTLY
Qty: 90 TABLET | Refills: 3 | Status: SHIPPED | OUTPATIENT
Start: 2025-02-03

## 2025-02-04 LAB
BASOPHILS # BLD AUTO: 0.03 10*3/MM3 (ref 0–0.2)
BASOPHILS NFR BLD AUTO: 0.6 % (ref 0–1.5)
BUN SERPL-MCNC: 10 MG/DL (ref 6–20)
BUN/CREAT SERPL: 8.8 (ref 7–25)
CALCIUM SERPL-MCNC: 9.4 MG/DL (ref 8.6–10.5)
CHLORIDE SERPL-SCNC: 104 MMOL/L (ref 98–107)
CHOLEST SERPL-MCNC: 162 MG/DL (ref 0–200)
CO2 SERPL-SCNC: 29.2 MMOL/L (ref 22–29)
CREAT SERPL-MCNC: 1.14 MG/DL (ref 0.76–1.27)
EGFRCR SERPLBLD CKD-EPI 2021: 84.9 ML/MIN/1.73
EOSINOPHIL # BLD AUTO: 0.1 10*3/MM3 (ref 0–0.4)
EOSINOPHIL NFR BLD AUTO: 1.9 % (ref 0.3–6.2)
ERYTHROCYTE [DISTWIDTH] IN BLOOD BY AUTOMATED COUNT: 12.5 % (ref 12.3–15.4)
GLUCOSE SERPL-MCNC: 102 MG/DL (ref 65–99)
HCT VFR BLD AUTO: 48.3 % (ref 37.5–51)
HDLC SERPL-MCNC: 35 MG/DL (ref 40–60)
HGB BLD-MCNC: 16.8 G/DL (ref 13–17.7)
IMM GRANULOCYTES # BLD AUTO: 0.02 10*3/MM3 (ref 0–0.05)
IMM GRANULOCYTES NFR BLD AUTO: 0.4 % (ref 0–0.5)
LDLC SERPL CALC-MCNC: 94 MG/DL (ref 0–100)
LYMPHOCYTES # BLD AUTO: 1.62 10*3/MM3 (ref 0.7–3.1)
LYMPHOCYTES NFR BLD AUTO: 30.9 % (ref 19.6–45.3)
MCH RBC QN AUTO: 29.5 PG (ref 26.6–33)
MCHC RBC AUTO-ENTMCNC: 34.8 G/DL (ref 31.5–35.7)
MCV RBC AUTO: 84.7 FL (ref 79–97)
MONOCYTES # BLD AUTO: 0.34 10*3/MM3 (ref 0.1–0.9)
MONOCYTES NFR BLD AUTO: 6.5 % (ref 5–12)
NEUTROPHILS # BLD AUTO: 3.13 10*3/MM3 (ref 1.7–7)
NEUTROPHILS NFR BLD AUTO: 59.7 % (ref 42.7–76)
NRBC BLD AUTO-RTO: 0 /100 WBC (ref 0–0.2)
PLATELET # BLD AUTO: 227 10*3/MM3 (ref 140–450)
POTASSIUM SERPL-SCNC: 4.6 MMOL/L (ref 3.5–5.2)
RBC # BLD AUTO: 5.7 10*6/MM3 (ref 4.14–5.8)
SODIUM SERPL-SCNC: 141 MMOL/L (ref 136–145)
TRIGL SERPL-MCNC: 189 MG/DL (ref 0–150)
VLDLC SERPL CALC-MCNC: 33 MG/DL (ref 5–40)
WBC # BLD AUTO: 5.24 10*3/MM3 (ref 3.4–10.8)

## 2025-02-04 NOTE — PROGRESS NOTES
Your fasting glucose is just a tiny bit elevated at 102.  Your triglycerides are at 189 they need to be below 150 however they have decreased from 343 congratulations on that.  Your good cholesterol is at 35 that could be a little bit higher with exercise.  Your complete blood cell count is fine at this time. Resident

## 2025-02-28 ENCOUNTER — PATIENT MESSAGE (OUTPATIENT)
Dept: FAMILY MEDICINE CLINIC | Facility: CLINIC | Age: 40
End: 2025-02-28
Payer: COMMERCIAL